# Patient Record
Sex: FEMALE | Race: WHITE | NOT HISPANIC OR LATINO | Employment: FULL TIME | URBAN - METROPOLITAN AREA
[De-identification: names, ages, dates, MRNs, and addresses within clinical notes are randomized per-mention and may not be internally consistent; named-entity substitution may affect disease eponyms.]

---

## 2018-06-28 ENCOUNTER — OFFICE VISIT (OUTPATIENT)
Dept: INTERNAL MEDICINE | Facility: CLINIC | Age: 28
End: 2018-06-28
Payer: COMMERCIAL

## 2018-06-28 VITALS
HEART RATE: 68 BPM | SYSTOLIC BLOOD PRESSURE: 114 MMHG | BODY MASS INDEX: 21.91 KG/M2 | WEIGHT: 108.69 LBS | OXYGEN SATURATION: 100 % | TEMPERATURE: 96 F | HEIGHT: 59 IN | DIASTOLIC BLOOD PRESSURE: 60 MMHG

## 2018-06-28 DIAGNOSIS — T50.B95A: Primary | ICD-10-CM

## 2018-06-28 PROCEDURE — 99202 OFFICE O/P NEW SF 15 MIN: CPT | Mod: S$GLB,,, | Performed by: FAMILY MEDICINE

## 2018-06-28 PROCEDURE — 3008F BODY MASS INDEX DOCD: CPT | Mod: CPTII,S$GLB,, | Performed by: FAMILY MEDICINE

## 2018-06-28 PROCEDURE — 99999 PR PBB SHADOW E&M-NEW PATIENT-LVL III: CPT | Mod: PBBFAC,,, | Performed by: FAMILY MEDICINE

## 2018-06-28 RX ORDER — MEDROXYPROGESTERONE ACETATE 150 MG/ML
150 INJECTION, SUSPENSION INTRAMUSCULAR
COMMUNITY

## 2018-06-28 NOTE — PROGRESS NOTES
CHIEF COMPLAINT  [place]      HISTORY OF PRESENT ILLNESS    She reports that on June 6, 2018, she had her second HPV vaccine administered at her gynecologist office, injected in her left upper arm. Her initial HPV vaccine was given about a year prior to that. She reports that for several days after the injection, she had localized tenderness and pain. She says that the pain had largely resolved, but at the same site there developed a swelling of tissue over the last several days. On exam today, she has a poorly defined area of tissue induration with maximum diameters approximately 2 x 3 cm. There is no erythema, hyperthermia, or fluctuance. It is minimally tender. She says that the area is NOT becoming more painful and NOT getting bigger. She reports no associated fever or systemic symptoms. It was agreed to treat this with watchful waiting, cold compress as needed, and over-the-counter analgesics as needed. I told her to expect complete resolution of symptoms over the next couple of weeks. If not, she is to let me know. I told her to report this occurrence to her gynecologist and share a copy of my progress note with her gynecologist prior to receiving 3rd HPV vaccination.    Problem List Items Addressed This Visit     None      Visit Diagnoses     Adverse effect of human papillomavirus vaccine, initial encounter    -  Primary          PAST MEDICAL HISTORY, FAMILY HISTORY and SOCIAL HISTORY reviewed by me (JOSE Yun MD) and are updated consistent with the patient's report.    CURRENT MEDICATION LIST, and ALLERGY LIST reviewed by me (JOSE Yun MD) and are updated consistent with the patient's report as follows:    No outpatient prescriptions prior to visit.     No facility-administered medications prior to visit.        Allergies as of 06/28/2018    (No Known Allergies)       REVIEW OF SYSTEMS  CONSTITUTIONAL: No fever or chills reported.   HEMATOLOGIC: No recent blood clots or bleeding problems  "reported.     PHYSICAL EXAM  Vitals:    06/28/18 0906   BP: 114/60   BP Location: Right arm   Patient Position: Sitting   BP Method: Medium (Manual)   Pulse: 68   Temp: 96.4 °F (35.8 °C)   TempSrc: Tympanic   SpO2: 100%   Weight: 49.3 kg (108 lb 11 oz)   Height: 4' 11" (1.499 m)     CONSTITUTIONAL: Vital signs noted. No apparent distress. Does not appear acutely ill or septic. Appears adequately hydrated.  HEENT: External ENT grossly unremarkable. Hearing grossly intact. Oropharynx moist.  PULM: Lungs clear. Breathing unlabored.  HEART: Auscultation reveals regular rate and rhythm without murmur, gallop or rub.  DERM: Skin warm and moist with normal turgor. Description of relevant exam of this system is documented above in HPI.   NEURO: There are no gross focal motor deficits or gross deficits of cranial nerves III-XII.  PSYCHIATRIC: Alert and oriented x 3. Mood is grossly neutral. Affect appropriate. Judgment and insight not grossly compromised.  MUSCULOSKELETAL: Grossly normal stance and gait.     ASSESSMENT and PLAN  Adverse effect of human papillomavirus vaccine, initial encounter        PRESCRIPTION MEDICATION MANAGEMENT  Except as noted below, CURRENT MEDICATIONS are to remain unchanged from that listed above.     There are no discontinued medications.    Follow-up if symptoms worsen or fail to improve.    There are no Patient Instructions on file for this visit.    ABOUT THIS DOCUMENTATION:  · The order of the conditions listed in the HPI is one of convenience and does not necessarily reflect the chronology of the appointment, nor the relative importance of a condition.  · Documentation entered by me for this encounter was done in part using speech-recognition technology. Although I have made an effort to ensure accuracy, "sound like" errors may exist and should be interpreted in context.                        -JOSE Yun MD       "